# Patient Record
Sex: FEMALE | URBAN - METROPOLITAN AREA
[De-identification: names, ages, dates, MRNs, and addresses within clinical notes are randomized per-mention and may not be internally consistent; named-entity substitution may affect disease eponyms.]

---

## 2023-08-29 ENCOUNTER — APPOINTMENT (OUTPATIENT)
Dept: URBAN - METROPOLITAN AREA CLINIC 211 | Age: 58
Setting detail: DERMATOLOGY
End: 2023-08-30

## 2023-08-29 DIAGNOSIS — Z41.9 ENCOUNTER FOR PROCEDURE FOR PURPOSES OTHER THAN REMEDYING HEALTH STATE, UNSPECIFIED: ICD-10-CM

## 2023-08-29 DIAGNOSIS — L98.8 OTHER SPECIFIED DISORDERS OF THE SKIN AND SUBCUTANEOUS TISSUE: ICD-10-CM

## 2023-08-29 PROCEDURE — OTHER BOTOX: OTHER

## 2023-08-29 PROCEDURE — OTHER MIPS QUALITY: OTHER

## 2023-08-29 PROCEDURE — OTHER COUNSELING: OTHER

## 2023-08-29 PROCEDURE — OTHER COSMETIC CONSULTATION: BOTOX: OTHER

## 2023-08-29 ASSESSMENT — LOCATION SIMPLE DESCRIPTION DERM: LOCATION SIMPLE: RIGHT FOREHEAD

## 2023-08-29 ASSESSMENT — LOCATION ZONE DERM: LOCATION ZONE: FACE

## 2023-08-29 ASSESSMENT — LOCATION DETAILED DESCRIPTION DERM: LOCATION DETAILED: RIGHT MEDIAL FOREHEAD

## 2023-08-29 NOTE — PROCEDURE: BOTOX
Show [place] Units: No
Consent: Written consent obtained. Risks include but not limited to lid/brow ptosis, bruising, swelling, diplopia, temporary effect, incomplete chemical denervation.
[facility] Units: 0
Show Additional Area 1: Yes
Dilution (U/0.1 Cc): 4
Post-Care Instructions: Patient instructed to not lie down for 4 hours and limit physical activity for 24 hours.
Incrementing Botox Units: By 0.5 Units
Reconstitution Date (Optional): 08/28/2023
Detail Level: Zone
Glabellar Complex Units: 18
Show Inventory Tab: Show
Price (Use Numbers Only, No Special Characters Or $): 13

## 2023-08-31 ENCOUNTER — APPOINTMENT (OUTPATIENT)
Dept: URBAN - METROPOLITAN AREA CLINIC 211 | Age: 58
Setting detail: DERMATOLOGY
End: 2023-08-31

## 2023-08-31 DIAGNOSIS — Z41.9 ENCOUNTER FOR PROCEDURE FOR PURPOSES OTHER THAN REMEDYING HEALTH STATE, UNSPECIFIED: ICD-10-CM

## 2023-08-31 PROCEDURE — OTHER OTHER: OTHER

## 2023-08-31 PROCEDURE — OTHER MIPS QUALITY: OTHER

## 2023-08-31 NOTE — PROCEDURE: OTHER
Detail Level: Zone
Other (Free Text): Cosmetic Consultation\\nPE:\\n\\nPLAN:\\n\\n1.\\n\\n2.\\n\\n3.\\n\\nNOTE:\\n\\Dhaval indications, treatment expectations (including management of any possible irritations), protocols, risks and benefits, pre/post care are reviewed.  Patient understands that multiple treatments may be necessary for optimum results and that on going maintenance with at-home products and additional office visits or treatments may be needed to enhance and extend the desired results.\\n\\Dhaval questions were addressed.\\n\\nRTC-
Render Risk Assessment In Note?: no
Note Text (......Xxx Chief Complaint.): This diagnosis correlates with the

## 2023-08-31 NOTE — HPI: OTHER
Condition:: Cosmetic consultation
Please Describe Your Condition:: is an established patient who is being seen for a chief complaint of Cosmetic consultation . Patient presents today for a consultation to discuss skin aging concerns and would like to get a  \"RX compound\" that she has obtained from her previous dermatologist for managing hyperpigmentation.

## 2023-09-01 ENCOUNTER — RX ONLY (RX ONLY)
Age: 58
End: 2023-09-01

## 2023-10-24 ENCOUNTER — APPOINTMENT (OUTPATIENT)
Dept: URBAN - METROPOLITAN AREA CLINIC 211 | Age: 58
Setting detail: DERMATOLOGY
End: 2023-10-25

## 2023-10-24 DIAGNOSIS — Z41.9 ENCOUNTER FOR PROCEDURE FOR PURPOSES OTHER THAN REMEDYING HEALTH STATE, UNSPECIFIED: ICD-10-CM

## 2023-10-24 PROCEDURE — OTHER FILLERS: OTHER

## 2023-10-24 PROCEDURE — OTHER MIPS QUALITY: OTHER

## 2023-10-24 PROCEDURE — OTHER INVENTORY: OTHER

## 2023-10-24 NOTE — HPI: COSMETIC (FILLERS)
Have You Had Fillers Before?: has not had fillers
Additional History: Bothered by IVON and kymberly but going to start by addressing midface volume depletion

## 2023-10-24 NOTE — PROCEDURE: FILLERS
Marionette Lines Filler  Volume In Cc: 0
Cheeks Filler Volume In Cc: 1
Consent: Written consent obtained. We discussed the risks and benefits of soft tissue augmentation with hyaluronic acid including but not limited to swelling, bruising, allergic reaction, infection, incomplete augmentation, irregular contouring, procedural pain, superficial bleeding or foreign body reaction. We also reviewed rare but serious complications such as vascular occlusion which can result in necrosis or ulceration. Patient understands that treatment with fillers only augment areas on a temporary basis, usually for 12-24 months. We reviewed variability in patient response and that no guarantee of length of benefit can be made. Patient understands that the treatment is cosmetic in nature and not covered by insurance.
Filler Comments: Patient will consider a second syringe in the cheek area and then also start working on the marionette lines and around the mouth at her next visit.
Detail Level: Zone
Anesthesia Type: 1% lidocaine with epinephrine
Map Statment: See Attach Map for Complete Details
Topical Anesthesia?: 23% lidocaine, 7% tetracaine
Additional Area 1 Location: Perioral lines
Include Cannula Information In Note?: No
Filler: Juvederm Voluma XC
Additional Anesthesia Volume In Cc: 6
Use Map Statement For Sites (Optional): Yes
Aspiration Statement: Aspiration was performed prior to injecting site with filler
Post-Care Instructions: After the procedure, patient instructed to apply ice to reduce swelling.

## 2024-02-07 ENCOUNTER — APPOINTMENT (OUTPATIENT)
Dept: URBAN - METROPOLITAN AREA CLINIC 211 | Age: 59
Setting detail: DERMATOLOGY
End: 2024-02-07

## 2024-02-07 DIAGNOSIS — Z41.9 ENCOUNTER FOR PROCEDURE FOR PURPOSES OTHER THAN REMEDYING HEALTH STATE, UNSPECIFIED: ICD-10-CM

## 2024-02-07 PROCEDURE — OTHER FILLERS: OTHER

## 2024-02-07 PROCEDURE — OTHER INVENTORY: OTHER

## 2024-02-07 PROCEDURE — OTHER MIPS QUALITY: OTHER

## 2024-02-07 PROCEDURE — OTHER RECOMMENDATIONS: OTHER

## 2024-02-07 PROCEDURE — OTHER PHOTO-DOCUMENTATION: OTHER

## 2024-02-07 NOTE — PROCEDURE: RECOMMENDATIONS
Recommendations (Free Text): Can use 1 syringe and evaluate outcome. However, discussed would need at least 2 syringes to make significant impact on marionette areas. Reassured that she can do either 1 or 2 but I want her to be happy with the outcome. Pt agreed to treat with additional syringe after observing correction s/p 1 syringe.
Render Risk Assessment In Note?: no
Detail Level: Simple
Recommendation Preamble: The following recommendations were made during the visit:

## 2024-02-07 NOTE — PROCEDURE: FILLERS
Additional Area 4 Volume In Cc: 0
Aspiration Statement: Aspiration was performed prior to injecting site with filler.
Lot #: [id number]
Consent: Written consent obtained. Risks include but not limited to bruising, beading, irregular texture, ulceration, infection, allergic reaction, scar formation, incomplete augmentation, temporary nature, and procedural pain.
Marionette Lines Filler Volume In Cc: 2
Expiration Date (Month Year): 05/11/24
Anesthesia Type: 1% lidocaine with epinephrine
Include Cannula Information In Note?: No
Post-Care Instructions: After the procedure, patient instructed to apply ice to reduce swelling.
Anesthesia Volume In Cc: 0.5
Additional Anesthesia Volume In Cc: 6
Topical Anesthesia?: 23% lidocaine, 7% tetracaine
Additional Area 1 Location: Perioral lines
Detail Level: Zone
Use Map Statement For Sites (Optional): Yes
Filler: Juvederm Ultra Plus XC
Map Statment: See Attach Map for Complete Details

## 2024-02-07 NOTE — PROCEDURE: PHOTO-DOCUMENTATION
Detail Level: Simple
Photo Preface (Leave Blank If You Do Not Want): Photographs were obtained today for monitoring
Details (Free Text): Before and after

## 2024-02-09 ENCOUNTER — APPOINTMENT (OUTPATIENT)
Dept: URBAN - METROPOLITAN AREA CLINIC 212 | Age: 59
Setting detail: DERMATOLOGY
End: 2024-02-12

## 2024-02-09 DIAGNOSIS — T1490XA CONTUSION OF UNSPECIFIED SITE: ICD-10-CM

## 2024-02-09 PROBLEM — S00.83XA CONTUSION OF OTHER PART OF HEAD, INITIAL ENCOUNTER: Status: ACTIVE | Noted: 2024-02-09

## 2024-02-09 PROCEDURE — OTHER PRESCRIPTION: OTHER

## 2024-02-09 PROCEDURE — OTHER MIPS QUALITY: OTHER

## 2024-02-09 PROCEDURE — OTHER PHOTO-DOCUMENTATION: OTHER

## 2024-02-09 PROCEDURE — OTHER TREATMENT REGIMEN: OTHER

## 2024-02-09 PROCEDURE — OTHER COUNSELING: OTHER

## 2024-02-09 RX ORDER — DOXYCYCLINE HYCLATE 100 MG/1
CAPSULE, GELATIN COATED ORAL
Qty: 20 | Refills: 0 | Status: ERX | COMMUNITY
Start: 2024-02-09

## 2024-02-09 RX ORDER — METHYLPREDNISOLONE 4 MG/1
TABLET ORAL
Qty: 1 | Refills: 0 | Status: ERX | COMMUNITY
Start: 2024-02-09

## 2024-02-09 ASSESSMENT — LOCATION SIMPLE DESCRIPTION DERM: LOCATION SIMPLE: LEFT CHEEK

## 2024-02-09 ASSESSMENT — LOCATION ZONE DERM: LOCATION ZONE: FACE

## 2024-02-09 ASSESSMENT — LOCATION DETAILED DESCRIPTION DERM: LOCATION DETAILED: LEFT CENTRAL BUCCAL CHEEK

## 2024-02-09 NOTE — PROCEDURE: TREATMENT REGIMEN
Plan: Exam consistent with hematoma in setting of extensive ecchymosis. No evidence of infection or occlusion; however hematoma can serve as nidus for infection.\\n\\n1.  Rec warm compresses to affected area\\n2. Continue Skintensive bruise cream\\n3.  Start doxy 100 mg take 1 cap po bid with food x 10 days\\n4.  Medrol dose pack \\n5.  MD cell phone given in case any issues arise over the weekend\\n6. Will check in with pt on Monday
Detail Level: Zone
No